# Patient Record
Sex: FEMALE | Race: WHITE
[De-identification: names, ages, dates, MRNs, and addresses within clinical notes are randomized per-mention and may not be internally consistent; named-entity substitution may affect disease eponyms.]

---

## 2021-12-14 ENCOUNTER — HOSPITAL ENCOUNTER (OUTPATIENT)
Dept: HOSPITAL 75 - ONC | Age: 71
LOS: 17 days | Discharge: HOME | End: 2021-12-31
Attending: INTERNAL MEDICINE
Payer: MEDICARE

## 2021-12-14 DIAGNOSIS — Z80.3: ICD-10-CM

## 2021-12-14 DIAGNOSIS — Z85.3: ICD-10-CM

## 2021-12-14 DIAGNOSIS — I82.551: ICD-10-CM

## 2021-12-14 DIAGNOSIS — Z80.42: ICD-10-CM

## 2021-12-14 DIAGNOSIS — I82.511: Primary | ICD-10-CM

## 2021-12-14 DIAGNOSIS — Z80.41: ICD-10-CM

## 2021-12-14 DIAGNOSIS — Z79.01: ICD-10-CM

## 2021-12-14 DIAGNOSIS — Z90.13: ICD-10-CM

## 2021-12-14 PROCEDURE — 99214 OFFICE O/P EST MOD 30 MIN: CPT

## 2022-01-04 ENCOUNTER — HOSPITAL ENCOUNTER (OUTPATIENT)
Dept: HOSPITAL 75 - ONC | Age: 72
LOS: 27 days | Discharge: HOME | End: 2022-01-31
Attending: INTERNAL MEDICINE
Payer: MEDICARE

## 2022-01-04 DIAGNOSIS — Z90.11: ICD-10-CM

## 2022-01-04 DIAGNOSIS — I10: ICD-10-CM

## 2022-01-04 DIAGNOSIS — Z79.811: ICD-10-CM

## 2022-01-04 DIAGNOSIS — Z98.890: ICD-10-CM

## 2022-01-04 DIAGNOSIS — C50.411: Primary | ICD-10-CM

## 2022-01-04 DIAGNOSIS — I82.401: ICD-10-CM

## 2022-01-04 DIAGNOSIS — Z80.3: ICD-10-CM

## 2022-01-04 DIAGNOSIS — J45.20: ICD-10-CM

## 2022-01-04 DIAGNOSIS — Z79.01: ICD-10-CM

## 2022-01-04 PROCEDURE — 99213 OFFICE O/P EST LOW 20 MIN: CPT

## 2023-02-08 ENCOUNTER — HOSPITAL ENCOUNTER (OUTPATIENT)
Dept: HOSPITAL 75 - RAD | Age: 73
End: 2023-02-08
Attending: FAMILY MEDICINE
Payer: MEDICARE

## 2023-02-08 DIAGNOSIS — R06.00: Primary | ICD-10-CM

## 2023-02-08 DIAGNOSIS — Z72.0: ICD-10-CM

## 2023-02-08 PROCEDURE — 71046 X-RAY EXAM CHEST 2 VIEWS: CPT

## 2023-02-08 NOTE — DIAGNOSTIC IMAGING REPORT
EXAMINATION: CHEST (PA AND LATERAL)



CLINICAL INDICATION: 72-year-old female, dyspnea.



COMPARISON: None.



FINDINGS: Heart size and mediastinal contours are unremarkable.

There is no identified pneumothorax. There is no pleural

effusion. There is no identified focal airspace consolidation.

There are degenerative changes of the spine. There is cervical

spine hardware.



IMPRESSION: 

1. No identified acute cardiopulmonary abnormality.



Dictated by: 



  Dictated on workstation # [id number]